# Patient Record
Sex: MALE | Employment: OTHER | URBAN - METROPOLITAN AREA
[De-identification: names, ages, dates, MRNs, and addresses within clinical notes are randomized per-mention and may not be internally consistent; named-entity substitution may affect disease eponyms.]

---

## 2024-07-12 ENCOUNTER — APPOINTMENT (OUTPATIENT)
Dept: OPHTHALMOLOGY | Facility: CLINIC | Age: 74
End: 2024-07-12
Payer: MEDICARE

## 2024-08-30 ENCOUNTER — APPOINTMENT (OUTPATIENT)
Dept: OPHTHALMOLOGY | Facility: CLINIC | Age: 74
End: 2024-08-30
Payer: MEDICARE

## 2024-12-20 ENCOUNTER — APPOINTMENT (OUTPATIENT)
Dept: OPHTHALMOLOGY | Facility: CLINIC | Age: 74
End: 2024-12-20
Payer: MEDICARE

## 2024-12-20 DIAGNOSIS — H25.813 COMBINED FORMS OF AGE-RELATED CATARACT OF BOTH EYES: Primary | ICD-10-CM

## 2024-12-20 DIAGNOSIS — H52.03 HYPEROPIA OF BOTH EYES: ICD-10-CM

## 2024-12-20 DIAGNOSIS — H52.4 PRESBYOPIA: ICD-10-CM

## 2024-12-20 PROCEDURE — 92015 DETERMINE REFRACTIVE STATE: CPT | Performed by: OPTOMETRIST

## 2024-12-20 PROCEDURE — 92014 COMPRE OPH EXAM EST PT 1/>: CPT | Performed by: OPTOMETRIST

## 2024-12-20 ASSESSMENT — CONF VISUAL FIELD
OS_SUPERIOR_NASAL_RESTRICTION: 0
OD_NORMAL: 1
OD_SUPERIOR_NASAL_RESTRICTION: 0
OD_SUPERIOR_TEMPORAL_RESTRICTION: 0
OD_INFERIOR_NASAL_RESTRICTION: 0
OS_INFERIOR_TEMPORAL_RESTRICTION: 0
OS_NORMAL: 1
OS_SUPERIOR_TEMPORAL_RESTRICTION: 0
OD_INFERIOR_TEMPORAL_RESTRICTION: 0
OS_INFERIOR_NASAL_RESTRICTION: 0

## 2024-12-20 ASSESSMENT — REFRACTION_MANIFEST
OS_AXIS: 150
OS_SPHERE: +0.50
OS_CYLINDER: -0.50
OD_CYLINDER: -0.50
OD_ADD: +2.50
OD_AXIS: 105
OD_SPHERE: +1.00
OS_ADD: +2.50

## 2024-12-20 ASSESSMENT — REFRACTION_WEARINGRX
OD_AXIS: 095
OD_ADD: +2.75
OD_CYLINDER: -1.00
OD_SPHERE: +1.50
OS_CYLINDER: -1.00
OS_SPHERE: +1.00
OS_AXIS: 080
OS_ADD: +2.75

## 2024-12-20 ASSESSMENT — EXTERNAL EXAM - LEFT EYE: OS_EXAM: NORMAL

## 2024-12-20 ASSESSMENT — TONOMETRY
OD_IOP_MMHG: 19
IOP_METHOD: GOLDMANN APPLANATION
OS_IOP_MMHG: 20

## 2024-12-20 ASSESSMENT — EXTERNAL EXAM - RIGHT EYE: OD_EXAM: NORMAL

## 2024-12-20 ASSESSMENT — VISUAL ACUITY
METHOD: SNELLEN - LINEAR
OD_SC: 20/25
OS_SC: 20/25

## 2024-12-20 ASSESSMENT — CUP TO DISC RATIO
OD_RATIO: 0.4
OS_RATIO: 0.35

## 2024-12-20 ASSESSMENT — SLIT LAMP EXAM - LIDS
COMMENTS: GOOD POSITION
COMMENTS: GOOD POSITION

## 2024-12-20 NOTE — PROGRESS NOTES
Assessment/Plan   Diagnoses and all orders for this visit:  Combined forms of age-related cataract of both eyes  Mild increase in density of cataracts, visual acuity (VA) still acceptable, continue to monitor     Hyperopia of both eyes  Presbyopia  A spectacle prescription was dispensed to be used as needed.    May also use OTC readers +2.75 or +3.00 Both eyes    1 year

## 2026-01-02 ENCOUNTER — APPOINTMENT (OUTPATIENT)
Dept: OPHTHALMOLOGY | Facility: CLINIC | Age: 76
End: 2026-01-02
Payer: MEDICARE